# Patient Record
Sex: MALE | URBAN - METROPOLITAN AREA
[De-identification: names, ages, dates, MRNs, and addresses within clinical notes are randomized per-mention and may not be internally consistent; named-entity substitution may affect disease eponyms.]

---

## 2021-05-27 ENCOUNTER — RECORDS - HEALTHEAST (OUTPATIENT)
Dept: ADMINISTRATIVE | Facility: CLINIC | Age: 61
End: 2021-05-27

## 2023-08-21 ENCOUNTER — TRANSCRIBE ORDERS (OUTPATIENT)
Dept: ADMINISTRATIVE | Facility: HOSPITAL | Age: 63
End: 2023-08-21

## 2023-08-21 ENCOUNTER — LAB (OUTPATIENT)
Dept: LAB | Facility: HOSPITAL | Age: 63
End: 2023-08-21
Payer: COMMERCIAL

## 2023-08-21 DIAGNOSIS — Z96.649: ICD-10-CM

## 2023-08-21 DIAGNOSIS — Z96.649: Primary | ICD-10-CM

## 2023-08-21 LAB
CRP SERPL-MCNC: <0.3 MG/DL (ref 0–0.5)
ERYTHROCYTE [SEDIMENTATION RATE] IN BLOOD: 1 MM/HR (ref 0–20)

## 2023-08-21 PROCEDURE — 36415 COLL VENOUS BLD VENIPUNCTURE: CPT

## 2023-08-21 PROCEDURE — 86140 C-REACTIVE PROTEIN: CPT

## 2023-08-21 PROCEDURE — 85652 RBC SED RATE AUTOMATED: CPT

## 2024-01-15 ENCOUNTER — OFFICE VISIT (OUTPATIENT)
Dept: ORTHOPEDIC SURGERY | Facility: CLINIC | Age: 64
End: 2024-01-15
Payer: COMMERCIAL

## 2024-01-15 VITALS — WEIGHT: 186 LBS | BODY MASS INDEX: 26.63 KG/M2 | TEMPERATURE: 97.5 F | HEIGHT: 70 IN

## 2024-01-15 DIAGNOSIS — M17.0 PRIMARY OSTEOARTHRITIS OF BOTH KNEES: Primary | ICD-10-CM

## 2024-01-15 DIAGNOSIS — R52 PAIN: ICD-10-CM

## 2024-01-15 PROCEDURE — 99203 OFFICE O/P NEW LOW 30 MIN: CPT | Performed by: ORTHOPAEDIC SURGERY

## 2024-01-15 PROCEDURE — 73562 X-RAY EXAM OF KNEE 3: CPT | Performed by: ORTHOPAEDIC SURGERY

## 2024-01-15 RX ORDER — DICLOFENAC SODIUM 75 MG/1
75 TABLET, DELAYED RELEASE ORAL
COMMUNITY
Start: 2023-07-26 | End: 2024-07-25

## 2024-01-15 RX ORDER — TAMSULOSIN HYDROCHLORIDE 0.4 MG/1
1 CAPSULE ORAL 2 TIMES DAILY
COMMUNITY
Start: 2018-11-21

## 2024-01-15 RX ORDER — PREDNISONE 20 MG/1
TABLET ORAL
COMMUNITY
Start: 2023-10-17

## 2024-01-15 RX ORDER — MECLIZINE HYDROCHLORIDE 25 MG/1
25 TABLET ORAL
COMMUNITY
Start: 2023-12-21

## 2024-01-15 RX ORDER — VALACYCLOVIR HYDROCHLORIDE 1 G/1
1000 TABLET, FILM COATED ORAL 3 TIMES DAILY
COMMUNITY
Start: 2023-10-17

## 2024-01-15 RX ORDER — BUPROPION HYDROCHLORIDE 150 MG/1
150 TABLET ORAL DAILY
COMMUNITY
Start: 2018-07-02

## 2024-01-15 RX ORDER — ZOLPIDEM TARTRATE 5 MG/1
TABLET ORAL
COMMUNITY

## 2024-01-15 RX ORDER — ESCITALOPRAM OXALATE 5 MG/1
5 TABLET ORAL DAILY
COMMUNITY

## 2024-01-15 RX ORDER — AMOXICILLIN 500 MG/1
TABLET, FILM COATED ORAL
COMMUNITY
Start: 2023-11-09

## 2024-01-15 RX ORDER — TADALAFIL 20 MG/1
1 TABLET ORAL DAILY PRN
COMMUNITY
Start: 2023-11-22

## 2024-01-15 RX ORDER — HYDROCODONE BITARTRATE AND ACETAMINOPHEN 7.5; 325 MG/1; MG/1
1 TABLET ORAL
COMMUNITY
Start: 2023-12-19 | End: 2024-01-18

## 2024-01-15 RX ORDER — IBUPROFEN 800 MG/1
800 TABLET ORAL
COMMUNITY
Start: 2023-09-08

## 2024-01-15 NOTE — PROGRESS NOTES
"Patient ID: Alin Almanza Jr.     Chief Complaint:    Chief Complaint   Patient presents with    Left Knee - Pain, Initial Evaluation    Right Knee - Pain, Initial Evaluation        HPI:    Alin Almanza Jr. is a 63 y.o. who presents today for evaluation of bilateral knee pain.  Patient states he has been treated previously for knee arthritis.  States that symptoms for several years but it has been worse recently.  He states he is got a series and injections in the past with the gel most effective.  Most recent gel injection was in May 2023.  Most recent injection which was steroid was in September 2023.  He is also on hydrocodone 7.5 that he takes 4 times a day for back pain.  He cannot take oral anti-inflammatories due to history of stomach issues.  States he has generalized knee pain worse with increased activity better with rest.  Notices more grinding about the left knee.  Overall symptoms are fairly equal.    Social History     Socioeconomic History    Marital status:    Tobacco Use    Smoking status: Never    Smokeless tobacco: Never   Vaping Use    Vaping Use: Never used   Substance and Sexual Activity    Alcohol use: Yes     Comment: social    Drug use: Defer    Sexual activity: Defer     History reviewed. No pertinent past medical history.  History reviewed. No pertinent family history.    ROS:    ROS:  Constitutional:  Denies fever, shaking or chills         All other pertinent positives and negatives as noted above in HPI.    Physical Exam:     Vital Signs:  Temp 97.5 °F (36.4 °C) (Temporal)   Ht 177.8 cm (70\")   Wt 84.4 kg (186 lb)   BMI 26.69 kg/m²   Constitutional: Awake alert and oriented x3, well developed, well nourished, no acute distress, non-toxic appearance.      Musculoskeletal:    Exam of the bilateral  knee:  Painful gait with a subtle limp  No muscle atrophy, erythema, ecchymosis, or gross deformity noted  no knee effusion  No  joint line tenderness  Active range of motion " 0-125 on the right and 5 degrees to 125 on the left  5/5 strength flexion and extension  The knee is stable to varus and valgus stress testing  Mild varus alignment of the limb  Lachman negative  Posterior drawer negative  Alley's negative  Patellofemoral grind +  Sensation grossly intact to light tough throughout the lower extremity  Skin is intact  Distal pulses are 2+  No signs or symptoms of DVT            Diagnostic Studies:     Imaging was personally and individually reviewed and discussed at length with the patient:    3V bilateral knee(s) were taken in the office today, including AP, lateral, and sunrise views to evaluate the patient's complaint:  Weight bearing views show moderate degenerative changes in all three compartments with the medial compartment being most affected and is severe/bone-on-bone.  There is early osteophyte formation throughout all three compartments.  There is no evidence of fracture or dislocation.  No periosteal reactions or medullary lesions are seen.  Patellar height and alignment are within normal limits.     Comparison films not available                Assessment:     bilateral  Knee Osteoarthritis            Plan:     Based on x-rays, history, and office evaluation, we have diagnosed Alin Almanza  with knee arthritis. At this time, we recommend starting with a conservative treatment program. This will consist of cortisone injection during significant flare-ups, NSAIDS for daily maintenance, physical therapy for strengthening and modalities as indicated, and bracing when appropriate. These measures will continue, until symptoms are no longer relieved, become more severe or function begins to significantly deteriorate. At that point joint replacement options will be discussed.    This point patient may try some over-the-counter noninflammatory gel.  Will also submit for authorization for gel injections.    Follow up in as needed unless symptoms return or a new issue  occurs.  Patient will call the office to schedule an appointment.     All questions were answered, the patient understands and agrees with the plan.

## 2024-01-16 ENCOUNTER — PATIENT ROUNDING (BHMG ONLY) (OUTPATIENT)
Dept: ORTHOPEDIC SURGERY | Facility: CLINIC | Age: 64
End: 2024-01-16
Payer: COMMERCIAL

## 2024-01-16 NOTE — PROGRESS NOTES
A LookTracker Message has been sent to the patient for PATIENT ROUNDING with [facility] [place] [facility]

## 2024-01-24 ENCOUNTER — TELEPHONE (OUTPATIENT)
Dept: ORTHOPEDIC SURGERY | Facility: CLINIC | Age: 64
End: 2024-01-24
Payer: COMMERCIAL

## 2024-01-24 NOTE — TELEPHONE ENCOUNTER
Patient called to check status of gel injection approval. He is approved for Durolane and stated that you were gonna work him in one afternoon for this. When do you want to see him?

## 2024-01-24 NOTE — TELEPHONE ENCOUNTER
Spoke with patient and scheduled him for tomorrow 1/25 at 3pm for LINDSEY KNEE INJ (DUROLANE)  Lori can you please add patient to the schedule

## 2024-01-25 ENCOUNTER — CLINICAL SUPPORT (OUTPATIENT)
Dept: ORTHOPEDIC SURGERY | Facility: CLINIC | Age: 64
End: 2024-01-25
Payer: COMMERCIAL

## 2024-01-25 VITALS — WEIGHT: 186.4 LBS | TEMPERATURE: 98.6 F | BODY MASS INDEX: 26.69 KG/M2 | HEIGHT: 70 IN

## 2024-01-25 DIAGNOSIS — M17.0 PRIMARY OSTEOARTHRITIS OF BOTH KNEES: Primary | ICD-10-CM

## 2024-01-25 DIAGNOSIS — R52 PAIN: ICD-10-CM

## 2024-01-25 RX ORDER — HYDROCODONE BITARTRATE AND ACETAMINOPHEN 7.5; 325 MG/1; MG/1
1 TABLET ORAL
COMMUNITY
Start: 2024-01-19 | End: 2024-02-18

## 2024-01-25 NOTE — PROGRESS NOTES
"Knee Joint Injection      Patient: Alin Almanza Jr.        YOB: 1960            Chief Complaints: Knee pain      History of Present Illness:  Pt gets intermittent  injections with good relief. Is here for bilateral knee injections understands options.      Physical Exam: 63 y.o. male  General Appearance:    Alert, cooperative, in no acute distress                   Vitals:    01/25/24 1507   Temp: 98.6 °F (37 °C)   TempSrc: Temporal   Weight: 84.6 kg (186 lb 6.4 oz)   Height: 177.8 cm (70\")   PainSc: 10-Worst pain ever   PainLoc: Knee  Comment: left and right      Patient is alert and read ×3 no acute distress appears her above-listed at height weight and age.  Affect is normal respiratory rate is normal unlabored. Heart rate regular rate rhythm, sclera, dentition and hearing are normal for the purpose of this exam.  Exam and complaints are unchanged.      Procedure:  Bilateral knee gel injections          Assessment. Persistent knee pain      Plan: Is to proceed with injection    Tolerated injection well.  Continue conservative treatment.  Follow-up as needed.    ..Large Joint Arthrocentesis: R knee  Date/Time: 1/25/2024 4:06 PM  Consent given by: patient  Site marked: site marked  Timeout: Immediately prior to procedure a time out was called to verify the correct patient, procedure, equipment, support staff and site/side marked as required   Supporting Documentation  Indications: pain   Procedure Details  Location: knee - R knee  Preparation: Patient was prepped and draped in the usual sterile fashion  Needle gauge: 21g.  Approach: lateral  Medications administered: 60 mg Sodium Hyaluronate 60 MG/3ML  Patient tolerance: patient tolerated the procedure well with no immediate complications      Large Joint Arthrocentesis: L knee  Date/Time: 1/25/2024 4:14 PM  Consent given by: patient  Site marked: site marked  Timeout: Immediately prior to procedure a time out was called to verify the correct " patient, procedure, equipment, support staff and site/side marked as required   Supporting Documentation  Indications: pain   Procedure Details  Location: knee - L knee  Preparation: Patient was prepped and draped in the usual sterile fashion  Needle gauge: 21g.  Approach: lateral  Medications administered: 60 mg Sodium Hyaluronate 60 MG/3ML  Patient tolerance: patient tolerated the procedure well with no immediate complications

## 2024-03-14 ENCOUNTER — OFFICE VISIT (OUTPATIENT)
Dept: ORTHOPEDIC SURGERY | Facility: CLINIC | Age: 64
End: 2024-03-14
Payer: COMMERCIAL

## 2024-03-14 VITALS — TEMPERATURE: 98.6 F | HEIGHT: 70 IN | BODY MASS INDEX: 26.39 KG/M2 | WEIGHT: 184.3 LBS

## 2024-03-14 DIAGNOSIS — M17.0 PRIMARY OSTEOARTHRITIS OF BOTH KNEES: Primary | ICD-10-CM

## 2024-03-14 RX ORDER — LIDOCAINE HYDROCHLORIDE 10 MG/ML
2 INJECTION, SOLUTION EPIDURAL; INFILTRATION; INTRACAUDAL; PERINEURAL
Status: COMPLETED | OUTPATIENT
Start: 2024-03-14 | End: 2024-03-14

## 2024-03-14 RX ORDER — HYDROCODONE BITARTRATE AND ACETAMINOPHEN 7.5; 325 MG/1; MG/1
1 TABLET ORAL
COMMUNITY
Start: 2024-02-29 | End: 2024-03-30

## 2024-03-14 RX ORDER — METHYLPREDNISOLONE ACETATE 80 MG/ML
80 INJECTION, SUSPENSION INTRA-ARTICULAR; INTRALESIONAL; INTRAMUSCULAR; SOFT TISSUE
Status: COMPLETED | OUTPATIENT
Start: 2024-03-14 | End: 2024-03-14

## 2024-03-14 RX ADMIN — METHYLPREDNISOLONE ACETATE 80 MG: 80 INJECTION, SUSPENSION INTRA-ARTICULAR; INTRALESIONAL; INTRAMUSCULAR; SOFT TISSUE at 13:40

## 2024-03-14 RX ADMIN — LIDOCAINE HYDROCHLORIDE 2 ML: 10 INJECTION, SOLUTION EPIDURAL; INFILTRATION; INTRACAUDAL; PERINEURAL at 13:41

## 2024-03-14 RX ADMIN — LIDOCAINE HYDROCHLORIDE 2 ML: 10 INJECTION, SOLUTION EPIDURAL; INFILTRATION; INTRACAUDAL; PERINEURAL at 13:40

## 2024-03-14 RX ADMIN — METHYLPREDNISOLONE ACETATE 80 MG: 80 INJECTION, SUSPENSION INTRA-ARTICULAR; INTRALESIONAL; INTRAMUSCULAR; SOFT TISSUE at 13:41

## 2024-03-14 NOTE — PROGRESS NOTES
"Knee Joint Injection      Patient: Alin Almanza Jr.        YOB: 1960            Chief Complaints: Knee pain      History of Present Illness:  Pt gets intermittent  injections with good relief. Is here for bilateral knee steroid injections.  Reports the gel injections only helped minimally.  Continues have some generalized knee pain and swelling.  Understands options.      Physical Exam: 63 y.o. male  General Appearance:    Alert, cooperative, in no acute distress                   Vitals:    03/14/24 1310   Temp: 98.6 °F (37 °C)   TempSrc: Temporal   Weight: 83.6 kg (184 lb 4.8 oz)   Height: 177.8 cm (70\")   PainSc: 10-Worst pain ever   PainLoc: Knee      Patient is alert and read ×3 no acute distress appears her above-listed at height weight and age.  Affect is normal respiratory rate is normal unlabored. Heart rate regular rate rhythm, sclera, dentition and hearing are normal for the purpose of this exam.  Exam and complaints are unchanged.      Procedure:  Bilateral knee steroid injections.          Assessment. Persistent knee pain      Plan: Is to proceed with injection    Continue conservative treatment.  Follow-up in 3 to 6 months.    ..Large Joint Arthrocentesis: R knee  Date/Time: 3/14/2024 1:40 PM  Consent given by: patient  Site marked: site marked  Timeout: Immediately prior to procedure a time out was called to verify the correct patient, procedure, equipment, support staff and site/side marked as required   Supporting Documentation  Indications: pain   Procedure Details  Location: knee - R knee  Preparation: Patient was prepped and draped in the usual sterile fashion  Needle gauge: 21g.  Approach: lateral  Medications administered: 2 mL lidocaine PF 1% 1 %; 80 mg methylPREDNISolone acetate 80 MG/ML  Patient tolerance: patient tolerated the procedure well with no immediate complications      Large Joint Arthrocentesis: L knee  Date/Time: 3/14/2024 1:41 PM  Consent given by: patient  Site " marked: site marked  Timeout: Immediately prior to procedure a time out was called to verify the correct patient, procedure, equipment, support staff and site/side marked as required   Supporting Documentation  Indications: pain   Procedure Details  Location: knee - L knee  Preparation: Patient was prepped and draped in the usual sterile fashion  Needle gauge: 21g.  Approach: lateral  Medications administered: 2 mL lidocaine PF 1% 1 %; 80 mg methylPREDNISolone acetate 80 MG/ML  Patient tolerance: patient tolerated the procedure well with no immediate complications

## 2024-06-19 ENCOUNTER — OFFICE VISIT (OUTPATIENT)
Dept: ORTHOPEDIC SURGERY | Facility: CLINIC | Age: 64
End: 2024-06-19
Payer: COMMERCIAL

## 2024-06-19 VITALS — BODY MASS INDEX: 25.42 KG/M2 | TEMPERATURE: 98.6 F | HEIGHT: 71 IN | WEIGHT: 181.6 LBS

## 2024-06-19 DIAGNOSIS — M17.0 PRIMARY OSTEOARTHRITIS OF BOTH KNEES: Primary | ICD-10-CM

## 2024-06-19 RX ORDER — DESVENLAFAXINE SUCCINATE 50 MG/1
50 TABLET, EXTENDED RELEASE ORAL DAILY
COMMUNITY
Start: 2024-04-26 | End: 2025-04-26

## 2024-06-19 RX ORDER — TADALAFIL 5 MG/1
5-20 TABLET ORAL DAILY
COMMUNITY
Start: 2024-04-26 | End: 2025-04-26

## 2024-06-19 NOTE — PROGRESS NOTES
"Knee Joint Injection      Patient: Alin Almanza Jr.        YOB: 1960            Chief Complaints: Knee pain      History of Present Illness:  Pt gets intermittent  injections with good relief. Is here for bilateral Zilretta injections.  Understands options.      Physical Exam: 63 y.o. male  General Appearance:    Alert, cooperative, in no acute distress                   Vitals:    06/19/24 1001   Temp: 98.6 °F (37 °C)   TempSrc: Temporal   Weight: 82.4 kg (181 lb 9.6 oz)   Height: 180.3 cm (71\")   PainSc: 10-Worst pain ever   PainLoc: Knee  Comment: left and right      Patient is alert and read ×3 no acute distress appears her above-listed at height weight and age.  Affect is normal respiratory rate is normal unlabored. Heart rate regular rate rhythm, sclera, dentition and hearing are normal for the purpose of this exam.  Exam and complaints are unchanged.      Procedure:  Bilateral Zilretta injections.          Assessment. Persistent knee pain      Plan: Is to proceed with injection    Bilateral Zilretta injections.  Patient states he is moving to [place] in a month.  Follow-up as needed.    Large Joint Arthrocentesis: R knee  Date/Time: 6/19/2024 10:28 AM  Consent given by: patient  Site marked: site marked  Timeout: Immediately prior to procedure a time out was called to verify the correct patient, procedure, equipment, support staff and site/side marked as required   Supporting Documentation  Indications: pain   Procedure Details  Location: knee - R knee  Preparation: Patient was prepped and draped in the usual sterile fashion  Needle gauge: 21g.  Approach: lateral  Medications administered: 32 mg Triamcinolone Acetonide 32 MG  Patient tolerance: patient tolerated the procedure well with no immediate complications      Large Joint Arthrocentesis: L knee  Date/Time: 6/19/2024 10:29 AM  Consent given by: patient  Site marked: site marked  Timeout: Immediately prior to procedure a time out was " called to verify the correct patient, procedure, equipment, support staff and site/side marked as required   Supporting Documentation  Indications: pain   Procedure Details  Location: knee - L knee  Preparation: Patient was prepped and draped in the usual sterile fashion  Needle gauge: 21g.  Approach: lateral  Medications administered: 32 mg Triamcinolone Acetonide 32 MG  Patient tolerance: patient tolerated the procedure well with no immediate complications